# Patient Record
Sex: MALE | Race: WHITE | NOT HISPANIC OR LATINO
[De-identification: names, ages, dates, MRNs, and addresses within clinical notes are randomized per-mention and may not be internally consistent; named-entity substitution may affect disease eponyms.]

---

## 2018-06-15 ENCOUNTER — APPOINTMENT (OUTPATIENT)
Dept: NUCLEAR MEDICINE | Facility: IMAGING CENTER | Age: 63
End: 2018-06-15
Payer: MEDICARE

## 2018-06-15 ENCOUNTER — OUTPATIENT (OUTPATIENT)
Dept: OUTPATIENT SERVICES | Facility: HOSPITAL | Age: 63
LOS: 1 days | End: 2018-06-15
Payer: MEDICARE

## 2018-06-15 DIAGNOSIS — Z00.8 ENCOUNTER FOR OTHER GENERAL EXAMINATION: ICD-10-CM

## 2018-06-15 PROCEDURE — 78815 PET IMAGE W/CT SKULL-THIGH: CPT | Mod: 26,PS

## 2018-06-15 PROCEDURE — A9587: CPT

## 2018-06-15 PROCEDURE — 78815 PET IMAGE W/CT SKULL-THIGH: CPT

## 2019-08-06 ENCOUNTER — APPOINTMENT (OUTPATIENT)
Dept: NUCLEAR MEDICINE | Facility: IMAGING CENTER | Age: 64
End: 2019-08-06
Payer: MEDICARE

## 2019-08-06 ENCOUNTER — OUTPATIENT (OUTPATIENT)
Dept: OUTPATIENT SERVICES | Facility: HOSPITAL | Age: 64
LOS: 1 days | End: 2019-08-06
Payer: MEDICARE

## 2019-08-06 DIAGNOSIS — Z00.8 ENCOUNTER FOR OTHER GENERAL EXAMINATION: ICD-10-CM

## 2019-08-06 PROCEDURE — 74176 CT ABD & PELVIS W/O CONTRAST: CPT

## 2019-08-06 PROCEDURE — 74176 CT ABD & PELVIS W/O CONTRAST: CPT | Mod: 26

## 2019-08-06 PROCEDURE — A9587: CPT

## 2019-08-06 PROCEDURE — 78815 PET IMAGE W/CT SKULL-THIGH: CPT | Mod: 26,PS

## 2019-08-06 PROCEDURE — 78815 PET IMAGE W/CT SKULL-THIGH: CPT

## 2020-11-16 ENCOUNTER — APPOINTMENT (OUTPATIENT)
Dept: NUCLEAR MEDICINE | Facility: IMAGING CENTER | Age: 65
End: 2020-11-16
Payer: MEDICARE

## 2020-11-16 ENCOUNTER — OUTPATIENT (OUTPATIENT)
Dept: OUTPATIENT SERVICES | Facility: HOSPITAL | Age: 65
LOS: 1 days | End: 2020-11-16
Payer: MEDICARE

## 2020-11-16 DIAGNOSIS — C7A.019 MALIGNANT CARCINOID TUMOR OF THE SMALL INTESTINE, UNSPECIFIED PORTION: ICD-10-CM

## 2020-11-16 PROCEDURE — 78815 PET IMAGE W/CT SKULL-THIGH: CPT | Mod: 26,PS

## 2020-11-16 PROCEDURE — A9587: CPT

## 2020-11-16 PROCEDURE — 78815 PET IMAGE W/CT SKULL-THIGH: CPT

## 2021-09-02 ENCOUNTER — APPOINTMENT (OUTPATIENT)
Dept: GASTROENTEROLOGY | Facility: CLINIC | Age: 66
End: 2021-09-02
Payer: MEDICARE

## 2021-09-02 VITALS
WEIGHT: 163 LBS | TEMPERATURE: 97.5 F | SYSTOLIC BLOOD PRESSURE: 126 MMHG | HEIGHT: 70 IN | BODY MASS INDEX: 23.34 KG/M2 | DIASTOLIC BLOOD PRESSURE: 84 MMHG

## 2021-09-02 DIAGNOSIS — Z00.00 ENCOUNTER FOR GENERAL ADULT MEDICAL EXAMINATION W/OUT ABNORMAL FINDINGS: ICD-10-CM

## 2021-09-02 DIAGNOSIS — Z87.891 PERSONAL HISTORY OF NICOTINE DEPENDENCE: ICD-10-CM

## 2021-09-02 DIAGNOSIS — M19.039 PRIMARY OSTEOARTHRITIS, UNSPECIFIED WRIST: ICD-10-CM

## 2021-09-02 DIAGNOSIS — M18.11 UNILATERAL PRIMARY OSTEOARTHRITIS OF FIRST CARPOMETACARPAL JOINT, RIGHT HAND: ICD-10-CM

## 2021-09-02 DIAGNOSIS — E34.0 CARCINOID SYNDROME: ICD-10-CM

## 2021-09-02 DIAGNOSIS — G56.03 CARPAL TUNNEL SYNDROM,BILATERAL UPPER LIMBS: ICD-10-CM

## 2021-09-02 DIAGNOSIS — R10.13 EPIGASTRIC PAIN: ICD-10-CM

## 2021-09-02 PROCEDURE — 99213 OFFICE O/P EST LOW 20 MIN: CPT

## 2021-09-02 RX ORDER — ASPIRIN 500 MG
325 TABLET ORAL
Refills: 0 | Status: ACTIVE | COMMUNITY

## 2021-09-02 RX ORDER — OXYCODONE HYDROCHLORIDE 15 MG/1
15 TABLET, FILM COATED, EXTENDED RELEASE ORAL
Refills: 0 | Status: ACTIVE | COMMUNITY

## 2021-09-02 NOTE — REVIEW OF SYSTEMS
[As Noted in HPI] : as noted in HPI [Negative] : Respiratory [FreeTextEntry2] : The patient is maintaining his weight. [FreeTextEntry7] : Occasional abdominal discomfort relieved with bowel movements.

## 2021-09-02 NOTE — ASSESSMENT
[FreeTextEntry1] : The patient is a 66-year-old male with known carcinoid syndrome.  Patient undergoes regular surveillance testing and is up-to-date on his colonoscopy and endoscopy.  He seems to be doing well on his regimen of Sandostatin as well as pain control with narcotic analgesics.  We will continue to current treatment and follow-up as per oncology.  The patient does not need any invasive GI testing at the present time.  He will send me a copy of his repeat scan when completed.  He  will also contact me with any change in his status.  The patient was given samples of a proton pump inhibitor in the event that some of his morning abdominal discomfort is on the basis of mild gastritis.

## 2021-09-02 NOTE — HISTORY OF PRESENT ILLNESS
[FreeTextEntry1] : I saw your patient Efrain Braga in the office today.  The patient is a 66-year-old male with a history of hypertension as well as known carcinoid syndrome.  He undergoes regular oncological examinations and does have several nodules which are being followed.  The patient takes Sandostatin for control of his symptoms and currently does not have any significant diarrhea.  He has no acid reflux and is maintaining his weight.  He has occasional morning discomfort in the abdomen which is relieved with bowel movements and does take narcotic analgesics for pain control.  The patient does not abuse tobacco caffeine or ethanol.  The patient had an upper endoscopy and colonoscopy done less than 3 years ago.

## 2022-02-24 DIAGNOSIS — R10.9 UNSPECIFIED ABDOMINAL PAIN: ICD-10-CM

## 2022-02-24 RX ORDER — DICYCLOMINE HYDROCHLORIDE 20 MG/1
20 TABLET ORAL
Qty: 90 | Refills: 0 | Status: ACTIVE | COMMUNITY
Start: 2022-02-24 | End: 1900-01-01